# Patient Record
Sex: MALE | Race: OTHER | HISPANIC OR LATINO | Employment: FULL TIME | URBAN - METROPOLITAN AREA
[De-identification: names, ages, dates, MRNs, and addresses within clinical notes are randomized per-mention and may not be internally consistent; named-entity substitution may affect disease eponyms.]

---

## 2017-09-25 ENCOUNTER — ALLSCRIPTS OFFICE VISIT (OUTPATIENT)
Dept: OTHER | Facility: OTHER | Age: 32
End: 2017-09-25

## 2017-09-25 DIAGNOSIS — I10 ESSENTIAL (PRIMARY) HYPERTENSION: ICD-10-CM

## 2017-10-13 ENCOUNTER — HOSPITAL ENCOUNTER (OUTPATIENT)
Dept: NON INVASIVE DIAGNOSTICS | Facility: HOSPITAL | Age: 32
Discharge: HOME | End: 2017-10-13
Attending: INTERNAL MEDICINE
Payer: COMMERCIAL

## 2017-10-13 DIAGNOSIS — I10 ESSENTIAL (PRIMARY) HYPERTENSION: ICD-10-CM

## 2017-10-13 PROCEDURE — 93017 CV STRESS TEST TRACING ONLY: CPT

## 2017-10-16 LAB
ARRHY DURING EX: NORMAL
CHEST PAIN STATEMENT: NORMAL
MAX DIASTOLIC BP: 82 MMHG
MAX HEART RATE: 193 BPM
MAX PREDICTED HEART RATE: 190 BPM
MAX. SYSTOLIC BP: 170 MMHG
PROTOCOL NAME: NORMAL
TARGET HR FORMULA: NORMAL
TEST INDICATION: NORMAL
TIME IN EXERCISE PHASE: 585 S

## 2018-01-14 VITALS
BODY MASS INDEX: 29.06 KG/M2 | WEIGHT: 174.44 LBS | HEIGHT: 65 IN | RESPIRATION RATE: 16 BRPM | DIASTOLIC BLOOD PRESSURE: 74 MMHG | SYSTOLIC BLOOD PRESSURE: 122 MMHG | HEART RATE: 76 BPM

## 2018-09-06 ENCOUNTER — OFFICE VISIT (OUTPATIENT)
Dept: CARDIOLOGY CLINIC | Facility: CLINIC | Age: 33
End: 2018-09-06
Payer: COMMERCIAL

## 2018-09-06 VITALS
DIASTOLIC BLOOD PRESSURE: 80 MMHG | HEART RATE: 72 BPM | WEIGHT: 180 LBS | HEIGHT: 65 IN | BODY MASS INDEX: 29.99 KG/M2 | SYSTOLIC BLOOD PRESSURE: 114 MMHG

## 2018-09-06 DIAGNOSIS — I10 HYPERTENSION, ESSENTIAL, BENIGN: Primary | ICD-10-CM

## 2018-09-06 PROCEDURE — 93000 ELECTROCARDIOGRAM COMPLETE: CPT | Performed by: INTERNAL MEDICINE

## 2018-09-06 PROCEDURE — 99213 OFFICE O/P EST LOW 20 MIN: CPT | Performed by: INTERNAL MEDICINE

## 2018-09-06 NOTE — PROGRESS NOTES
Cardiology Follow Up    Misael Barriga  1985  [place] [address] [address]  [phone]    1  Hypertension, essential, benign  POCT ECG       Interval History:   Cardiology follow-up  Continues to be asymptomatic  Admits to poor dietary compliance with sodium at times  Active but no regular exercise    Patient Active Problem List   Diagnosis    Hypertension, essential, benign     No past medical history on file  Social History     Social History    Marital status: /Civil Union     Spouse name: N/A    Number of children: N/A    Years of education: N/A     Occupational History    Not on file  Social History Main Topics    Smoking status: Never Smoker    Smokeless tobacco: Never Used    Alcohol use Not on file    Drug use: Unknown    Sexual activity: Not on file     Other Topics Concern    Not on file     Social History Narrative    No narrative on file      No family history on file  No past surgical history on file  Current Outpatient Prescriptions:     Multiple Vitamin (MULTIVITAMINS PO), Take 1 capsule by mouth daily, Disp: , Rfl:   No Known Allergies    Labs:  No visits with results within 6 Month(s) from this visit  Latest known visit with results is:   Hospital Outpatient Visit on 10/13/2017   Component Date Value    Protocol Name 10/13/2017 Carlos Givens Time In Exercise Phase 10/13/2017 585     MAX   SYSTOLIC BP 74/80/8408 485     Max Diastolic Bp 38/15/6572 82     Max Heart Rate 10/13/2017 193     Max Predicted Heart Rate 10/13/2017 190     Reason for Termination 10/13/2017                      Value:Fatigue  Dyspnea  Target Heart Rate Achieved      Test Indication 10/13/2017 Screening for CAD     Target Hr Formular 10/13/2017 (220 - Age)*85%     Arrhy During Ex 10/13/2017 none     Chest Pain Statement 10/13/2017 none Imaging: No results found  Review of Systems:  Review of Systems   Respiratory: Negative for apnea, shortness of breath, wheezing and stridor  Cardiovascular: Negative for chest pain, palpitations and leg swelling  Physical Exam:  Physical Exam   Constitutional: He appears well-developed and well-nourished  No distress  Eyes: No scleral icterus  Neck: No JVD present  Cardiovascular: Normal rate, regular rhythm, normal heart sounds and intact distal pulses  Exam reveals no gallop and no friction rub  No murmur heard  Pulmonary/Chest: Effort normal and breath sounds normal  No respiratory distress  He has no wheezes  Neurological: He is alert  Skin: He is not diaphoretic  Psychiatric: He has a normal mood and affect  Discussion/Summary:  Pre hypertension, the patient was treated with antihypertensive in the past currently not taking any medications  Regular exercise and adherence to a low-salt diet recommended

## 2021-03-10 DIAGNOSIS — Z23 ENCOUNTER FOR IMMUNIZATION: ICD-10-CM

## 2024-03-01 ENCOUNTER — TELEPHONE (OUTPATIENT)
Dept: PSYCHIATRY | Facility: CLINIC | Age: 39
End: 2024-03-01

## 2024-03-01 NOTE — TELEPHONE ENCOUNTER
90 day supply req for auth #[id number].   Patient has been added to the Medication Management wait list without a referral.    Insurance: aetnadine  Insurance Type:    Commercial [x]   Medicaid []   [facility] (if applicable)   Medicare []  [place] Preference[email or web address]  Provider Preference: no prov pref  Virtual: Yes [] No [x]  Were outside resources sent: Yes [] No [x]

## 2024-09-23 ENCOUNTER — TELEPHONE (OUTPATIENT)
Age: 39
End: 2024-09-23

## 2024-09-23 NOTE — TELEPHONE ENCOUNTER
"Behavioral Health Outpatient Intake Questions    Referred By   : PCP    Please advise interviewee that they need to answer all questions truthfully to allow for best care, and any misrepresentations of information may affect their ability to be seen at this clinic   => Was this discussed? Yes     If Minor Child (under age 18)    Who is/are the legal guardian(s) of the child?     Is there a custody agreement?      If \"YES\"- Custody orders must be obtained prior to scheduling the first appointment  In addition, Consent to Treatment must be signed by all legal guardians prior to scheduling the first appointment    If \"NO\"- Consent to Treatment must be signed by all legal guardians prior to scheduling the first appointment    Behavioral Health Outpatient Intake History -     Presenting Problem (in patient's own words): Patient states has anxiety and depression and son is autistic.     Are there any communication barriers for this patient?     No                                               If yes, please describe barriers:   If there is a unique situation, please refer to Jaime Tineo/Nicole Ricks for final determination.    Are you taking any psychiatric medications? No     If \"YES\" -What are they      If \"YES\" -Who prescribes?     Has the Patient previously received outpatient Talk Therapy or Medication Management from [facility]  No        If \"YES\"- When, Where and with Whom?         If \"NO\" -Has Patient received these services elsewhere?       If \"YES\" -When, Where, and with Whom?    Has the Patient abused alcohol or other substances in the last 6 months ? No       If \"YES\" -What substance, How much, How often?     If illegal substance: Refer to [facility] (for YUKI) or [facility].   If Alcohol in excess of 10 drinks per week:  Refer to [facility] (for [facility]) or [facility]    Legal History-     Is this treatment court ordered? No   If \"yes \"send to :  Talk Therapy : Send to Jaime Tineo/Nicole Ricks " "for final determination   Med Management: Send to Dr Dozier for final determination     Has the Patient been convicted of a felony?  NO   If \"Yes\" send to -When, What?  Talk Therapy: Send to Jaime Tineo/Nicole Ricks for final determination   Med Management: Send to Dr Dozier for final determination     ACCEPTED as a patient Yes  If \"Yes\" Appointment Date: 12/23/24 at 10:00am    Referred Elsewhere? No  If “Yes” - (Where? Ex: [facility], [facility], [facility], [facility], etc.)       Name of Insurance Co:Ricki  Insurance ID# [id number]  Insurance Phone #[phone]  If ins is primary or secondary?primary   If patient is a minor, parents information such as Name, D.O.B of guarantor.  NP forms sent via [employer]  Patient does not have capital any longer  "

## 2024-09-30 ENCOUNTER — TELEPHONE (OUTPATIENT)
Dept: PSYCHIATRY | Facility: CLINIC | Age: 39
End: 2024-09-30

## 2024-09-30 NOTE — TELEPHONE ENCOUNTER
One week follow up call for New Patient appointment with Michael Mahan [[id number]] on 12/23/24  was made on 9/.23/24 Writer informed patient of New Patient paperwork needing to be completed 5 days prior to the appointment. Writer confirmed paperwork has been sent via [employer].

## 2024-10-21 ENCOUNTER — TELEPHONE (OUTPATIENT)
Dept: PSYCHIATRY | Facility: CLINIC | Age: 39
End: 2024-10-21

## 2024-10-21 NOTE — TELEPHONE ENCOUNTER
One week follow up call for New Patient appointment with   Michael Mahan MD   on 01/13/2025 was made on 10/21/2024. Writer informed patient of New Patient paperwork needing to be completed 5 days prior to the appointment. Writer confirmed paperwork has been sent via [employer].    Appointment was made on: 10/14/2024

## 2025-01-13 ENCOUNTER — TELEPHONE (OUTPATIENT)
Age: 40
End: 2025-01-13

## 2025-01-13 ENCOUNTER — OFFICE VISIT (OUTPATIENT)
Dept: PSYCHIATRY | Facility: CLINIC | Age: 40
End: 2025-01-13
Payer: COMMERCIAL

## 2025-01-13 DIAGNOSIS — F41.1 GENERALIZED ANXIETY DISORDER: ICD-10-CM

## 2025-01-13 DIAGNOSIS — F33.1 MAJOR DEPRESSIVE DISORDER, RECURRENT, MODERATE (HCC): Primary | ICD-10-CM

## 2025-01-13 PROCEDURE — 90792 PSYCH DIAG EVAL W/MED SRVCS: CPT | Performed by: PSYCHIATRY & NEUROLOGY

## 2025-01-13 RX ORDER — SILODOSIN 4 MG/1
8 CAPSULE ORAL
COMMUNITY
Start: 2024-10-24

## 2025-01-13 NOTE — PSYCH
PSYCHIATRIC EVALUATION     [facility] - [facility]RIC [facility]    Name and Date of Birth:  Isabel Young 39 y.o. 1985 MRN: [id number]    Date of Visit: January 13, 2025    Source of Information: Patient himself who seems to be good historian.  His medical records in the [facility] was also reviewed.    Chief Complaint: Depression, anxiety, poor concentration and sleep    HPI: This is a 39-year-old  male, , has 2 young children, currently lives with his wife and children in [place], [place].  The patient works as a .  The patient denies any previous history of any psychiatric diagnosis or management.  Denies ever seeing any psychiatrist, psychotherapist or ever admitted to inpatient psychiatric unit.  He also denies ever being on any psychiatric medication other than over-the-counter sleep medication melatonin and ZzzQuil.  The patient reports he referred himself for management of depression and anxiety in the setting of multiple stressors.  Came in today for initial psychiatric evaluation.    The patient reports he has been struggling with multiple stressors including relationship with his spouse since he got  to her, parenting issues, having 1 child who has autism, having very limited social Trib[place], her parents were older and struggling with their physical health issues, and feeling his career is not progressing as expected.  Reports he has been feeling depressed since last 15 years after he got  though it has worsened over the last 4 to 5 years.  He describes his depression as feeling sad all the time, not achieving what he was expecting and feeling disappointed all the time, fatigue, poor concentration, poor sleep where he usually has difficulty falling and maintaining sleep.  Reports goes to bed around 10 PM but cannot fall sleep before 1130 p.m. to 12 AM.  Wakes up around 5:30 in the morning but also wakes up multiple times  through the night.  Reports appetite has been too much lately and eating all the time.  Poor concentration for the last 3 to 4 years since depression worsened.  Motivation though has been okay.  Does not endorse anhedonia and reports still enjoys some of the things that makes him happy.  Denies any suicidal thoughts or any history of suicide attempt or any self-injurious behaviors.  Denies any access to firearms.    The patient denies any history of manic or hypomanic episodes.    Reports also struggle with anxiety since last 15 years again mainly started after this marriage.  Feels his wife and he has not been very compatible and had different future goals.  Reports he worries all the time, feels there is always some stressor other, racing mind, affecting his sleep, at times anxiety increasing to where he experiences racing heart and sinking feeling in the stomach.  He though denies any history of full fledged panic attacks.  Denies any social anxiety.  Denies any symptoms of obsessive-compulsive disorder.  Denies any history of any abuse or trauma.    Patient denies any history of auditory or visual hallucination.  Does not endorse any paranoia or delusional ideation.  Denies any history of eating disorder.    Review Of Systems: Negative other than mentioned above    Constitutional negative   ENT negative   Cardiovascular negative   Respiratory negative   Gastrointestinal negative   Genitourinary negative   Musculoskeletal negative   Integumentary negative   Neurological negative   Endocrine negative   Other Symptoms none           Past Psychiatric History: No significant past psychiatric history other than mentioned in the HPI        Traumatic History:     Abuse: none  Other Traumatic Events: none       Substance Abuse History: Reports drinks alcohol rarely on average once every 1 or 2 months around 1-2 beer.  Denies any history of any alcohol abuse or substance use.          Longest clean time: not  applicable  History of Inpatient/Outpatient rehabilitation program: no  Smoking history: denies use  Use of caffeine:  3 to 4 cups of coffee daily.  Brief caffeine use counseling provided    Family Psychiatric/Substance Use History:     Psychiatric Illness:  Mother - depression, Father - depression, Grandmother - schizophrenia and dementia  Substance Abuse:  patient denies  Suicide Attempts:  patient denies    Social History:  Born & Raised in : [place]  Childhood Experiences: Great  Education: bachelor's degree  Learning Disabilities: none  Marital History:   Children: 2 children, both boys  Living Arrangement: lives in home with wife and sons  Occupational History: works as an   Functioning Relationships: good support system especially from parents  Legal History: none   History: None      Suicide/Homicide Risk Assessment:    Risk of Harm to Self:  The following ratings are based on assessment at the time of the interview  Demographic risk factors include: male  Historical Risk Factors include: history of depression, history of anxiety  Recent Specific Risk Factors include: current depressive symptoms, current anxiety symptoms  Protective Factors: no current suicidal ideation, access to mental health treatment, being a parent, being , connection to own children, stable living environment, stable job, supportive father and mother  Weapons: none and no firearms. The following steps have been taken to ensure weapons are properly secured: not applicable  Based on today's assessment, Isabel presents the following risk of harm to self: minimal    Risk of Harm to Others:  The following ratings are based on assessment at the time of the interview  Based on today's assessment, Isabel presents the following risk of harm to others: none    The following interventions are recommended: contracts for safety at present - agrees to go to ED if feeling unsafe, contracts for safety at  present - agrees to call Crisis Intervention Service if feeling unsafe    Past Medical History:    Past Medical History:   Diagnosis Date    BPH (benign prostatic hyperplasia)     Prehypertension         History reviewed. No pertinent surgical history.  No Known Allergies      Current Medications:      Current Outpatient Medications:     Silodosin 4 MG CAPS, Take 8 mg by mouth daily at bedtime, Disp: , Rfl:     Multiple Vitamin (MULTIVITAMINS PO), Take 1 capsule by mouth daily (Patient not taking: Reported on 1/13/2025), Disp: , Rfl:        OBJECTIVE:    Vital signs in last 24 hours:    There were no vitals filed for this visit.    Mental Status Evaluation:    Appearance age appropriate, casually dressed   Behavior cooperative, calm   Speech normal rate, normal volume, normal pitch   Mood depressed, anxious   Affect constricted   Thought Processes organized, goal directed   Associations intact associations   Thought Content no overt delusions   Perceptual Disturbances: no auditory hallucinations, no visual hallucinations   Abnormal Thoughts  Risk Potential Suicidal ideation - None  Homicidal ideation - None  Potential for aggression - No   Orientation oriented to person, place, time/date, and situation   Memory recent and remote memory grossly intact   Consciousness alert and awake   Attention Span Concentration Span attention span and concentration are age appropriate   Intellect appears to be of average intelligence   Insight intact   Judgement intact   Muscle Strength and  Gait normal gait and normal balance   Motor Activity no abnormal movements   Language no difficulty naming common objects, no difficulty repeating a phrase   Fund of Knowledge adequate knowledge of current events  adequate fund of knowledge regarding past history  adequate fund of knowledge regarding vocabulary    Pain none   Pain Scale 0       Laboratory Results: Most Recent Labs:   Lab Results   Component Value Date    SODIUM 140 07/31/2024     K 4.4 07/31/2024     07/31/2024    CO2 25 07/31/2024    BUN 18 07/31/2024    CREATININE 0.87 07/31/2024    CALCIUM 9.7 07/31/2024    AST 28 07/31/2024    ALT 45 07/31/2024    ALKPHOS 55 07/31/2024    TP 7.3 07/31/2024    TBILI 0.8 07/31/2024       Assessment/Plan: From evaluation of the patient today and review of his past psychiatric history, at this time I believe patient meets the criteria for major depressive disorder, recurrent currently moderate in severity and generalized anxiety disorder.  Patient possibly might have persistent depressive disorder but needs further evaluation to confirm or rule it out.  He does not seem to meet the criteria for any other mental health disorder.  Patient had a lot of stressors which seems to be the major triggering factor for depression and anxiety.  No SI or HI or any history of suicide attempts or self-harming behavior.  No access to firearms.  Future oriented and very motivated to get better.  A lot of protective factors including wife, children, parents and siblings.    Plan I reviewed with the patient in detail both pharmacologic and nonpharmacologic treatment option.  Patient was strongly advised about seeing a psychotherapist given he has a lot of stressors and to cope with it better.  Patient is agreeable with it and referral was sent by me today.  He also was recommended psychiatric medication treatment including SSRI along with other alternatives given.  The patient was agreeable to try medication for management of depression and anxiety and after reviewing all the options and agreed to starting Zoloft.  He will be started on Zoloft 25 mg 1 tablet daily for 1 week and then will increase to 2 tablets of 25 mg with total 50 mg daily for management of depression and anxiety.  He will follow up with me in 4 weeks and based on his assessment at that time dose can be further adjusted.  He was educated about the medication in detail including benefit, risk, side  effect, alternative, contraindication, dosage and frequency.  He was specially educated about risk of GI upset, tiredness, sexual side effects, effect on the heart, rare risk to still can happen of suicidal ideation or thoughts, risk of agitation or irritability, effect on the sleep and was advised to call me if there is any concern and to call crisis, 911 or visit nearby ER in case of any emergency or having SI or HI.  Patient verbalized understanding and agrees with the plan.  Routine blood work including CBC, CMP, TSH plus free T4, lipid panel and HbA1c was also ordered to have a baseline level.  His lipid panel also was abnormal when he last did blood work in July last year and has been advised that if his lipid panel is still abnormal he needs to follow up with his PCP or cardiologist to review if he needs to be on medication for it.  The patient follows with cardiologist soon and will get the EKG done at that office to have a baseline.     Diagnoses and all orders for this visit:    Major depressive disorder, recurrent, moderate (HCC)    Generalized anxiety disorder    Other orders  -     Silodosin 4 MG CAPS; Take 8 mg by mouth daily at bedtime          Treatment Recommendations:    Check Assessment/Plan section for details.    Risks/Benefits/Precautions:      Risks, Benefits And Possible Side Effects Of Medications:    Risks, benefits, and possible side effects of medications explained to Isabel and he verbalizes understanding and agreement for treatment.    Controlled Medication Discussion:     Not applicable    Treatment Plan;    Completed and signed during the session: Yes - with Isabel    Visit Time    Visit Start Time: 10:09 AM  Visit Stop Time: 11:18 AM  Total Visit Duration:  69 minutes    Michael Mahan MD 01/13/25

## 2025-01-13 NOTE — BH TREATMENT PLAN
TREATMENT PLAN (Medication Management Only)        [facility] - PSYCHIATRIC [facility]    Name and Date of Birth:  Isabel Young 39 y.o. 1985  Date of Treatment Plan: January 13, 2025  Diagnosis/Diagnoses:    1. Major depressive disorder, recurrent, moderate (HCC)    2. Generalized anxiety disorder      Strengths/Personal Resources for Self-Care: supportive family, supportive friends, taking medications as prescribed, ability to adapt to life changes, ability to communicate needs, ability to communicate well, ability to listen, ability to reason, ability to understand psychiatric illness, average or above intelligence, family ties, financial means.  Area/Areas of need (in own words): anxiety, depression  1. Long Term Goal: Feel happier.  Target Date:6 months - 7/13/2025  Person/Persons responsible for completion of goal: Isabel  2.  Short Term Objective (s) - How will we reach this goal?:   A. Provider new recommended medication/dosage changes and/or continue medication(s): continue current medications as prescribed.  B. N/A.  C. N/A.  Target Date:6 months - 7/13/2025  Person/Persons Responsible for Completion of Goal: Isabel  Progress Towards Goals: starting treatment  Treatment Modality: medication management every 4 weeks  Review due 180 days from date of this plan: 6 months - 7/13/2025  Expected length of service: maintenance  My Physician/PA/NP and I have developed this plan together and I agree to work on the goals and objectives. I understand the treatment goals that were developed for my treatment.

## 2025-01-29 ENCOUNTER — TELEPHONE (OUTPATIENT)
Age: 40
End: 2025-01-29

## 2025-01-29 NOTE — TELEPHONE ENCOUNTER
Pt called in stating they seen their MM provider 1/13 and weren't prescribed medication. Pt was concerned due to the provider stating during their visit they will make an follow up appointment in 4w to see how the medication is doing.     Please review to follow up with pt.

## 2025-01-30 DIAGNOSIS — F41.1 GENERALIZED ANXIETY DISORDER: ICD-10-CM

## 2025-01-30 DIAGNOSIS — F33.1 MAJOR DEPRESSIVE DISORDER, RECURRENT, MODERATE (HCC): Primary | ICD-10-CM

## 2025-01-30 RX ORDER — SERTRALINE HYDROCHLORIDE 25 MG/1
TABLET, FILM COATED ORAL
Qty: 53 TABLET | Refills: 0 | Status: SHIPPED | OUTPATIENT
Start: 2025-01-30 | End: 2025-03-01

## 2025-01-30 NOTE — TELEPHONE ENCOUNTER
Spoke with Isabel. Reviewed prescription/dosing for Zoloft. Advised he will need to call the office to let Dr. Mahan know how he is doing for the next refill. He verbalized understanding.    He also asked if the referral for therapy was sent that he and Dr. Mahan talked about. Reviewed Intake will follow up with him when referral is sent.

## 2025-02-21 DIAGNOSIS — F41.1 GENERALIZED ANXIETY DISORDER: ICD-10-CM

## 2025-02-21 DIAGNOSIS — F33.1 MAJOR DEPRESSIVE DISORDER, RECURRENT, MODERATE (HCC): ICD-10-CM

## 2025-02-21 RX ORDER — SERTRALINE HYDROCHLORIDE 25 MG/1
50 TABLET, FILM COATED ORAL DAILY
Qty: 60 TABLET | Refills: 0 | Status: SHIPPED | OUTPATIENT
Start: 2025-02-21 | End: 2025-03-23

## 2025-02-24 ENCOUNTER — TELEPHONE (OUTPATIENT)
Age: 40
End: 2025-02-24

## 2025-02-24 DIAGNOSIS — F33.1 MAJOR DEPRESSIVE DISORDER, RECURRENT, MODERATE (HCC): ICD-10-CM

## 2025-02-24 DIAGNOSIS — F41.1 GENERALIZED ANXIETY DISORDER: ICD-10-CM

## 2025-02-24 RX ORDER — SERTRALINE HYDROCHLORIDE 25 MG/1
TABLET, FILM COATED ORAL
Refills: 0 | OUTPATIENT
Start: 2025-02-24

## 2025-02-24 RX ORDER — SERTRALINE HYDROCHLORIDE 25 MG/1
50 TABLET, FILM COATED ORAL DAILY
Qty: 60 TABLET | Refills: 0 | Status: SHIPPED | OUTPATIENT
Start: 2025-02-24 | End: 2025-02-24 | Stop reason: SDUPTHER

## 2025-02-24 RX ORDER — SERTRALINE HYDROCHLORIDE 25 MG/1
50 TABLET, FILM COATED ORAL DAILY
Qty: 180 TABLET | Refills: 0 | Status: SHIPPED | OUTPATIENT
Start: 2025-02-24 | End: 2025-05-25

## 2025-02-24 NOTE — TELEPHONE ENCOUNTER
Pa needed   Name from pharmacy: SERTRALINE HCL 25 MG TABLET       Will file in chart as: sertraline (ZOLOFT) 25 mg tablet   Sig: N/A   Disp: Not specified    Refills: 0   Start: 2/24/2025   Class: Normal   Non-formulary For: Major depressive disorder, recurrent, moderate (HCC); Generalized anxiety disorder   Last ordered: Today (2/24/2025) by Michael Mahan MD   Last refill: 2/24/2025   Rx #: [id number]   Pharmacy comment: Alternative Requested:THIS MEDICATION NEEDS TO BE DISPENSED AS A 90 DAY SUPPLY PER INSURANCE REQUIREMENTS. PLEASE PROVIDE ADDITIONAL REFILLS IF APPROPRIATE.     Psychiatry:  Antidepressants - SSRI [id number]/24/2025 02:58 PM  Protocol Details Valid encounter within last 6 months  [employer] Emb[employer]ded [id number]/24/2025 02:58 PM  This is a duplicate request of medication ordered 2025-02-24. Check to see if the patient is requesting a refill from a new pharmacy.    This request has changes from the previous prescription.  To be filled at: [facility]/pharmacy #[id number] - [place] - [address]

## 2025-05-24 DIAGNOSIS — F33.1 MAJOR DEPRESSIVE DISORDER, RECURRENT, MODERATE (HCC): ICD-10-CM

## 2025-05-24 DIAGNOSIS — F41.1 GENERALIZED ANXIETY DISORDER: ICD-10-CM

## 2025-05-27 RX ORDER — SERTRALINE HYDROCHLORIDE 25 MG/1
50 TABLET, FILM COATED ORAL DAILY
Qty: 180 TABLET | Refills: 0 | Status: SHIPPED | OUTPATIENT
Start: 2025-05-27